# Patient Record
Sex: FEMALE | Race: WHITE | URBAN - METROPOLITAN AREA
[De-identification: names, ages, dates, MRNs, and addresses within clinical notes are randomized per-mention and may not be internally consistent; named-entity substitution may affect disease eponyms.]

---

## 2020-01-29 PROBLEM — J06.9 ACUTE URI: Status: ACTIVE | Noted: 2018-01-30

## 2020-01-29 PROBLEM — J30.9 ALLERGIC RHINITIS: Status: ACTIVE | Noted: 2017-08-24

## 2020-01-29 PROBLEM — N30.00 ACUTE CYSTITIS WITHOUT HEMATURIA: Status: ACTIVE | Noted: 2018-01-30

## 2020-09-05 ENCOUNTER — HOSPITAL ENCOUNTER (OUTPATIENT)
Dept: PREADMISSION TESTING | Age: 70
Setting detail: SPECIMEN
Discharge: HOME | End: 2020-09-09
Payer: MEDICARE

## 2020-09-05 PROCEDURE — U0003 INFECTIOUS AGENT DETECTION BY NUCLEIC ACID (DNA OR RNA); SEVERE ACUTE RESPIRATORY SYNDROME CORONAVIRUS 2 (SARS-COV-2) (CORONAVIRUS DISEASE [COVID-19]), AMPLIFIED PROBE TECHNIQUE, MAKING USE OF HIGH THROUGHPUT TECHNOLOGIES AS DESCRIBED BY CMS-2020-01-R: HCPCS

## 2020-09-08 RX ORDER — DOXYCYCLINE HYCLATE 100 MG
100 TABLET ORAL 2 TIMES DAILY
COMMUNITY

## 2020-09-09 ENCOUNTER — HOSPITAL ENCOUNTER (OUTPATIENT)
Age: 70
Setting detail: OUTPATIENT SURGERY
Discharge: HOME | End: 2020-09-09
Attending: PLASTIC SURGERY | Admitting: PLASTIC SURGERY

## 2020-09-09 VITALS
WEIGHT: 156 LBS | OXYGEN SATURATION: 99 % | HEIGHT: 64 IN | TEMPERATURE: 96.9 F | BODY MASS INDEX: 26.63 KG/M2 | SYSTOLIC BLOOD PRESSURE: 177 MMHG | HEART RATE: 93 BPM | DIASTOLIC BLOOD PRESSURE: 92 MMHG | RESPIRATION RATE: 16 BRPM

## 2020-09-09 LAB
SARS-COV-2, NAA: NOT DETECTED
SARS-COV-2, RAPID: NOT DETECTED
SARS-COV-2: NORMAL
SARS-COV-2: NORMAL
SOURCE: NORMAL

## 2020-09-09 PROCEDURE — 7100000010 HC PHASE II RECOVERY - FIRST 15 MIN: Performed by: PLASTIC SURGERY

## 2020-09-09 PROCEDURE — U0002 COVID-19 LAB TEST NON-CDC: HCPCS

## 2020-09-09 PROCEDURE — 7100000011 HC PHASE II RECOVERY - ADDTL 15 MIN: Performed by: PLASTIC SURGERY

## 2020-09-09 PROCEDURE — 2500000003 HC RX 250 WO HCPCS: Performed by: PLASTIC SURGERY

## 2020-09-09 PROCEDURE — 2709999900 HC NON-CHARGEABLE SUPPLY: Performed by: PLASTIC SURGERY

## 2020-09-09 PROCEDURE — 3600000002 HC SURGERY LEVEL 2 BASE: Performed by: PLASTIC SURGERY

## 2020-09-09 PROCEDURE — 3600000012 HC SURGERY LEVEL 2 ADDTL 15MIN: Performed by: PLASTIC SURGERY

## 2020-09-09 RX ORDER — LIDOCAINE HYDROCHLORIDE AND EPINEPHRINE 10; 10 MG/ML; UG/ML
INJECTION, SOLUTION INFILTRATION; PERINEURAL
Status: DISCONTINUED
Start: 2020-09-09 | End: 2020-09-09 | Stop reason: HOSPADM

## 2020-09-09 RX ORDER — BALANCED SALT SOLUTION ENRICHED WITH BICARBONATE, DEXTROSE, AND GLUTATHIONE
KIT INTRAOCULAR
Status: DISCONTINUED
Start: 2020-09-09 | End: 2020-09-09 | Stop reason: HOSPADM

## 2020-09-09 RX ORDER — LIDOCAINE HYDROCHLORIDE 10 MG/ML
INJECTION, SOLUTION INFILTRATION; PERINEURAL
Status: DISCONTINUED
Start: 2020-09-09 | End: 2020-09-09 | Stop reason: WASHOUT

## 2020-09-09 RX ORDER — BALANCED SALT SOLUTION ENRICHED WITH BICARBONATE, DEXTROSE, AND GLUTATHIONE
KIT INTRAOCULAR PRN
Status: DISCONTINUED | OUTPATIENT
Start: 2020-09-09 | End: 2020-09-09 | Stop reason: ALTCHOICE

## 2020-09-09 RX ORDER — LIDOCAINE HYDROCHLORIDE AND EPINEPHRINE 10; 10 MG/ML; UG/ML
INJECTION, SOLUTION INFILTRATION; PERINEURAL PRN
Status: DISCONTINUED | OUTPATIENT
Start: 2020-09-09 | End: 2020-09-09 | Stop reason: ALTCHOICE

## 2020-09-09 ASSESSMENT — PAIN - FUNCTIONAL ASSESSMENT: PAIN_FUNCTIONAL_ASSESSMENT: 0-10

## 2020-09-09 NOTE — H&P
Subjective:   Patient ID: Nicole Cornejo is a 81 y.o. female. HPI   Patient presents today for bilateral ear reduction. Also to revise left earlobe. .   Review of Systems   Constitutional: Negative. HENT: Negative for trouble swallowing. Respiratory: Negative for cough and shortness of breath. Cardiovascular: Negative for chest pain. Gastrointestinal: Negative for abdominal pain. Neurological: Negative for light-headedness and headaches. Psychiatric/Behavioral: Negative for dysphoric mood. Objective:   Physical Exam   Vitals signs and nursing note reviewed. Exam conducted with a chaperone present. Constitutional:   Appearance: Normal appearance. HENT:   Head: Normocephalic and atraumatic. Comments:     Mouth/Throat:   Mouth: Mucous membranes are moist.   Eyes:   Extraocular Movements: Extraocular movements intact. Conjunctiva/sclera: Conjunctivae normal.   Pupils: Pupils are equal, round, and reactive to light. Pulmonary:   Effort: Pulmonary effort is normal.   Skin:   General: Skin is warm and dry. Neurological:   General: No focal deficit present. Mental Status: She is alert and oriented to person, place, and time. Medical History   Medical History    Diagnosis  Date  Comment  Source    Degenerative disc disease    Provider    Arthritis   lower back, not formally diagnosed, yet.   Provider    Other Medical History    Diagnosis  Date  Comment  Source    Upper back pain  10/22/2015   Provider    Scoliosis    Provider    Rib deformity  10/22/2015   Provider    Neck pain  10/22/2015   Provider    Macromastia  10/22/2015   Provider    Lumbar herniated disc    Provider    Low back pain without sciatica  10/22/2015   Provider    Kyphosis (acquired) (postural)  10/22/2015   Provider    Intertrigo  10/22/2015   Provider    Fibrocystic disease of both breasts  10/22/2015   Provider    Chronic nonintractable headache  10/22/2015   Provider    Breast pain  10/22/2015   Provider    Bilateral shoulder pain  10/22/2015   Provider         Family History   Problem  Relation  Age of Onset  Comments    Diabetes  Father      Heart Disease  Father      High Blood Pressure  Mother      Stroke  Father           Social History   Tobacco History   Smoking Status   Never Smoker      Smokeless Tobacco Use   Never Used    Alcohol History   Alcohol Use Status   Yes  Drinks/Week   0 Standard drinks or equivalent per week  Amount   0.0 standard drinks of alcohol/wk  Comment   \"Very little. Once or twice a month. \"    Drug Use   Drug Use Status   No    Sexual Activity   Sexually Active   Not Asked              Surgical History   Procedure  Laterality  Date  Comment  Source    HYSTERECTOMY   1997? Provider    FACIAL COSMETIC SURGERY   07/27/2020  mini face lift with chemical peel  Provider    CATARACT REMOVAL  Bilateral  2005? Provider    BREAST REDUCTION SURGERY  Bilateral  3/15/2016  Dr. Donovan Natarajan. Provider    BLADDER SUSPENSION   2010? Provider         E-Cigarettes Vaping or Juuling   Questions    Vaping Use    Responses: Never User    Start Date    Does device contain nicotine? Quit Date    Vaping Type         Socioeconomic History   Employment History   No employment history on file.     Family and Education   Marital Status    Unknown    Social Identity   Preferred Language  Ethnicity  Race    English  Non-/Non   White    Financial Resource Strain   No financial resource strain value on [employer] Insecurity   No food insecurity information on file    Transportation Needs   No transportation needs information on file      Outpatient Medications    doxycycline hyclate (VIBRA-TABS) 100 MG tablet Take 100 mg by mouth 2 times daily    ibuprofen (ADVIL;MOTRIN) 800 MG tablet Take 1 tablet by mouth 3 times daily as needed for Pain    Cranberry 125 MG TABS Take 1 tablet by mouth daily    VITAMIN E PO Take 1 capsule by mouth 2 times daily     CALCIUM PO Take 1 tablet by mouth 2 times daily     Omega-3 Fatty Acids (FISH OIL PO) Take 1 capsule by mouth 2 times daily     Ascorbic Acid (VITAMIN C PO) Take 2 tablets by mouth daily     FIBER PO Take 1 tablet by mouth daily          Assessment:   Large ears. Plan:   I discussed reduction of her ears. I described the pattern used and the scars. She wishes to proceed. I have discussed with the patient the indication, alternatives, and the possible risks and/or complications which include but are not limited to those delineated on the consent form for the planned procedure and the anesthesia methods. All questions were answered to patient's satisfaction. Consent was reviewed and signed.

## 2020-09-09 NOTE — PROGRESS NOTES
Pt arrived. Covid testing done 09/5/20 & results not available yet. Taken to ER dept. ER called back stating they are out of Covid test Kits & are waiting for kits to arrive from [facility] soon.

## 2020-09-09 NOTE — BRIEF OP NOTE
Brief Postoperative Note      Patient: Bryan Jay  YOB: 1950  MRN: [id number]    Date of Procedure: 9/9/2020    Pre-Op Diagnosis: COSMETIC    Post-Op Diagnosis: Same       Procedure(s):  EAR OTOPLASTY - REDUCTION    Surgeon(s):  Florencia Euceda MD    Assistant:  * No surgical staff found *    Anesthesia: Local    Estimated Blood Loss (mL): Minimal    Complications: None    Specimens:   * No specimens in log *    Implants:  * No implants in log *      Drains: * No LDAs found *    Findings:     Electronically signed by Florencia Euceda MD on 9/9/2020 at 1:18 PM

## 2020-09-10 NOTE — OP NOTE
cartilage was approximated to shape the ear, giving a nice  contour and reduce the size. Following this, the skin was repaired with  interrupted mattress sutures of 4-0 black nylon. Nice reconstruction  was obtained. Attention was taken to the right ear. In similar fashion, resection of  skin and cartilage was done as well as reconstruction. The ears ended  up symmetrical.  Steri-Strips were placed as dressing. The patient  tolerated the procedure well. Blood loss, minimal.  Specimens, none. Complications, none. The patient was transferred to recovery in stable  condition.         Atul Tovar    D: 09/09/2020 19:01:11       T: 09/10/2020 0:01:50     [email or web address]  Job#: [id number]     Doc#: [id number]    CC:

## (undated) DEVICE — SOLUTION SURG PREP POV IOD 7.5% 4 OZ

## (undated) DEVICE — MHPB HEAD AND NECK  PACK: Brand: MEDLINE INDUSTRIES, INC.

## (undated) DEVICE — Z DISCONTINUED BY MEDLINE USE 2711682 TRAY SKIN PREP DRY W/ PREM GLV

## (undated) DEVICE — TOWEL,OR,DSP,ST,NATURAL,DLX,4/PK,20PK/CS: Brand: MEDLINE

## (undated) DEVICE — SUTURE ETHLN SZ 5-0 L18IN NONABSORBABLE BLK L13MM P-3 3/8 698H

## (undated) DEVICE — STANDARD HYPODERMIC NEEDLE,POLYPROPYLENE HUB: Brand: MONOJECT

## (undated) DEVICE — SUTURE VCRL + SZ 5-0 L18IN ABSRB UD P-3 3/8 CIR REV CUT NDL VCP493G

## (undated) DEVICE — INTENDED FOR TISSUE SEPARATION, AND OTHER PROCEDURES THAT REQUIRE A SHARP SURGICAL BLADE TO PUNCTURE OR CUT.: Brand: BARD-PARKER ® CARBON RIB-BACK BLADES

## (undated) DEVICE — ELECTRODE PT RET AD L9FT HI MOIST COND ADH HYDRGEL CORDED

## (undated) DEVICE — PENCIL ES L3M BTTN SWCH HOLSTER W/ BLDE ELECTRD EDGE

## (undated) DEVICE — SPONGE LAP W18XL18IN WHT ENHANCEDXRAY VISIBILITY DATA MSTR

## (undated) DEVICE — SYRINGE MED 10ML LUERLOCK TIP W/O SFTY DISP

## (undated) DEVICE — Device: Brand: FABCO

## (undated) DEVICE — SOLUTION IV IRRIG POUR BRL 0.9% SODIUM CHL 2F7124

## (undated) DEVICE — SUTURE PROL SZ 4-0 L18IN NONABSORBABLE BLU L16MM PC-3 3/8 8634G

## (undated) DEVICE — SUTURE VCRL SZ 4-0 L18IN ABSRB UD L13MM P-3 3/8 CIR PRIM J494H

## (undated) DEVICE — 3M™ STERI-STRIP™ BLEND TONE SKIN CLOSURES, B1551, TAN, 1/4 IN X 3 IN (6 MM X 75 MM), 3 STRIPS/ENVELOPE: Brand: 3M™ STERI-STRIP™

## (undated) DEVICE — MARKER,SKIN,WI/RULER AND LABELS: Brand: MEDLINE

## (undated) DEVICE — GLOVE ORANGE PI 7 1/2   MSG9075

## (undated) DEVICE — STRAP,POSITIONING,KNEE/BODY,FOAM,4X60": Brand: MEDLINE

## (undated) DEVICE — GLOVE SURG SZ 5.5 L12IN FNGR THK9.4MIL STD WHT LTX FREE